# Patient Record
Sex: MALE | Race: BLACK OR AFRICAN AMERICAN | NOT HISPANIC OR LATINO | URBAN - METROPOLITAN AREA
[De-identification: names, ages, dates, MRNs, and addresses within clinical notes are randomized per-mention and may not be internally consistent; named-entity substitution may affect disease eponyms.]

---

## 2023-05-25 ENCOUNTER — EMERGENCY (EMERGENCY)
Facility: HOSPITAL | Age: 23
LOS: 1 days | Discharge: HOME | End: 2023-05-25
Admitting: STUDENT IN AN ORGANIZED HEALTH CARE EDUCATION/TRAINING PROGRAM
Payer: COMMERCIAL

## 2023-05-25 VITALS
HEIGHT: 77 IN | RESPIRATION RATE: 18 BRPM | SYSTOLIC BLOOD PRESSURE: 118 MMHG | HEART RATE: 57 BPM | TEMPERATURE: 98 F | DIASTOLIC BLOOD PRESSURE: 74 MMHG | WEIGHT: 250 LBS | OXYGEN SATURATION: 98 %

## 2023-05-25 VITALS
HEART RATE: 60 BPM | OXYGEN SATURATION: 99 % | DIASTOLIC BLOOD PRESSURE: 81 MMHG | SYSTOLIC BLOOD PRESSURE: 130 MMHG | RESPIRATION RATE: 18 BRPM
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
## 2023-05-25 DIAGNOSIS — R22.42 LOCALIZED SWELLING, MASS AND LUMP, LEFT LOWER LIMB: ICD-10-CM

## 2023-05-25 DIAGNOSIS — Z87.39 PERSONAL HISTORY OF OTHER DISEASES OF THE MUSCULOSKELETAL SYSTEM AND CONNECTIVE TISSUE: ICD-10-CM
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
## 2023-05-25 DIAGNOSIS — S82.002A UNSPECIFIED FRACTURE OF LEFT PATELLA, INITIAL ENCOUNTER FOR CLOSED FRACTURE: ICD-10-CM

## 2023-05-25 DIAGNOSIS — M25.562 PAIN IN LEFT KNEE: ICD-10-CM

## 2023-05-25 PROCEDURE — 99285 EMERGENCY DEPT VISIT HI MDM: CPT

## 2023-05-25 PROCEDURE — G1004: CPT

## 2023-05-25 PROCEDURE — 93971 EXTREMITY STUDY: CPT | Mod: 26,LT

## 2023-05-25 PROCEDURE — 73562 X-RAY EXAM OF KNEE 3: CPT | Mod: 26,LT

## 2023-05-25 PROCEDURE — 73700 CT LOWER EXTREMITY W/O DYE: CPT | Mod: 26,LT,MG

## 2023-05-25 NOTE — ED ADULT NURSE NOTE - OBJECTIVE STATEMENT
Left knee pain and swelling, patient states hurt knee during an MMA bout, fell and injured left knee.  States taking Naproxen and ACE bandage and ice with no improvement.

## 2023-05-25 NOTE — ED PROVIDER NOTE - NSFOLLOWUPINSTRUCTIONS_ED_ALL_ED_FT
Wear knee immobilizer until follow up with Orthopedics.    You can bear weight and perform your normal activities but do not spar or compete in [employer] until reevaluated.    Please call Dr. Kaur's office to schedule follow up.   Address: [address] [place] [address]  Phone: [phone]    Return to the Emergency Department if you develop fever, numbness, weakness or any other concerns.

## 2023-05-25 NOTE — ED PROVIDER NOTE - CLINICAL SUMMARY MEDICAL DECISION MAKING FREE TEXT BOX
Pt hemodynamically stable. He has a large effusion of the left knee with tenderness over anterior patella. XR L knee notable for non-displaced fracture of the patella. Orthopedics consulted, requesting US venous LLE as pt complained of calf heaviness and CT L knee for further evaluation. US venous LLE without evidence of DVT. CT and ortho recs pending. Pt hemodynamically stable. He has a large effusion of the left knee with tenderness over anterior patella. XR L knee notable for non-displaced fracture of the patella. Orthopedics consulted, requesting US venous LLE as pt complained of calf heaviness and CT L knee for further evaluation. US venous LLE without evidence of DVT. CT pending. Ortho placed pt in knee immobilizer. Pt able to weight bear. Will f/u with Dr. Kaur.

## 2023-05-25 NOTE — ED PROVIDER NOTE - PHYSICAL EXAMINATION
VITAL SIGNS: I have reviewed nursing notes and confirm.  CONSTITUTIONAL: Well-developed; in no acute distress.   SKIN:  warm and dry, no acute rash.   HEAD:  normocephalic, atraumatic.  EYES: PERRL, EOM intact; conjunctiva and sclera clear.  ENT: No nasal discharge; airway clear.   NECK: Supple; non tender.  CARD: S1, S2 normal; no murmurs, gallops, or rubs. Regular rate and rhythm.   RESP:  Clear to auscultation b/l, no wheezes, rales or rhonchi.  ABD: Normal bowel sounds; soft; non-distended; non-tender; no guarding/ rebound.  EXT: Normal ROM. No clubbing, cyanosis or edema. 2+ pulses to b/l ue/le.  NEURO: Alert, oriented, grossly unremarkable  PSYCH: Cooperative, mood and affect appropriate.    LLE: Pt has knee effusion with tenderness over anterior patella. ROM limited by swelling. He has no calf tenderness or swelling. 2+ DP pulse. Distal sensation intact.

## 2023-05-25 NOTE — CONSULT NOTE ADULT - SUBJECTIVE AND OBJECTIVE BOX
Orthopaedic Surgery Consult Note    Attending Physician: Vincent  Consult requested by: ED    CC: L knee pain    HPI:  22yMale healthy  presenting with 3 week history of L knee pain. During sparring session 3 weeks ago pt hada  mechanical fall onto L knee. Presented to [facility] for evaluation, and was told that he did not have a fracture. Patient continued to exercise and spare [employer] fights but noticed he wasn't able to tolerate sessions due to L knee pain. Denies any new recent falls since. Has been able to ambulate since the fall but with discomfort. Patient also noted increase in L calf heaviness and swelling since 4 days ago. Denies any chest pain or shortness of breath. Denies any numbness/tingling to LLE. No previous LLE surgeries.    Allergies    No Known Allergies    Intolerances      PAST MEDICAL & SURGICAL HISTORY:      Meds:      Family History:  Denies family history of bleeding disorders    Social History:   Pt is a nonsmoker  Social EtOH use     Review of Systems:  All review of systems are negative except for those mentioned in HPI.    Physical Exam:  General: Pt Alert and oriented, NAD  L knee swollen, skin intact. no open wounds. no palpable step offs  L calf slightly more swollen compared to RLE, no calf tenderness  Pulses: 2+ dp, pt pulses, wwp, cap refill <3 seconds  Sensation: SILT throughout LE.  Motor: EHL/FHL/TA/GC 5/5, knee extension, hamstrings 5/5 strength b/l LE    Vital Signs Last 24 Hrs  T(C): 36.7 (25 May 2023 16:09), Max: 36.7 (25 May 2023 16:09)  T(F): 98 (25 May 2023 16:09), Max: 98 (25 May 2023 16:09)  HR: 57 (25 May 2023 16:09) (57 - 57)  BP: 118/74 (25 May 2023 16:09) (118/74 - 118/74)  BP(mean): --  RR: 18 (25 May 2023 16:09) (18 - 18)  SpO2: 98% (25 May 2023 16:09) (98% - 98%)    Parameters below as of 25 May 2023 16:09  Patient On (Oxygen Delivery Method): room air          Labs:              Imaging:   Xray  L Knee (AP/lateral/sunrise): nondisplaced vertical fracture of the patella    A/P: 22yMale with L patella fx.  - stable  - pain control  - placed in KI  - WBAT in KI  - obtain CT of L knee w/o contrast prior to discharge  - obtain US of LLE to r/o dvt  - f/u outpatient for observation of bony healing  - Discussed with Attending, Dr. Vincent Ceja, PGY-2  Ortho Pager [phone]

## 2023-05-25 NOTE — ED ADULT NURSE NOTE - NSFALLUNIVINTERV_ED_ALL_ED
Bed/Stretcher in lowest position, wheels locked, appropriate side rails in place/Call bell, personal items and telephone in reach/Instruct patient to call for assistance before getting out of bed/chair/stretcher/Non-slip footwear applied when patient is off stretcher/[place] to call system/Physically safe environment - no spills, clutter or unnecessary equipment/Purposeful proactive rounding/Room/bathroom lighting operational, light cord in reach

## 2023-05-25 NOTE — ED PROVIDER NOTE - OBJECTIVE STATEMENT
21yo male with no reported pmhx presents with left knee pain and swelling x3wk. Pt is a  and states he landed directly on his knee during a fight. He has continued to spar and is able to weight bear. He states he has pain in his knee cap and has had progressively worsening swelling. He denies other injuries or trauma. He is not taking anything currently for pain.

## 2023-08-16 ENCOUNTER — EMERGENCY (EMERGENCY)
Facility: HOSPITAL | Age: 23
LOS: 1 days | Discharge: HOME | End: 2023-08-16
Admitting: EMERGENCY MEDICINE
Payer: MEDICAID

## 2023-08-16 VITALS
OXYGEN SATURATION: 99 % | HEART RATE: 63 BPM | TEMPERATURE: 98 F | RESPIRATION RATE: 18 BRPM | DIASTOLIC BLOOD PRESSURE: 76 MMHG | SYSTOLIC BLOOD PRESSURE: 126 MMHG

## 2023-08-16 VITALS
HEART RATE: 58 BPM | DIASTOLIC BLOOD PRESSURE: 74 MMHG | OXYGEN SATURATION: 100 % | TEMPERATURE: 99 F | RESPIRATION RATE: 18 BRPM | SYSTOLIC BLOOD PRESSURE: 120 MMHG

## 2023-08-16 PROCEDURE — 73562 X-RAY EXAM OF KNEE 3: CPT | Mod: 26,RT

## 2023-08-16 PROCEDURE — 73590 X-RAY EXAM OF LOWER LEG: CPT | Mod: 26,RT

## 2023-08-16 PROCEDURE — 99284 EMERGENCY DEPT VISIT MOD MDM: CPT

## 2023-08-16 RX ORDER — IBUPROFEN 200 MG
600 TABLET ORAL ONCE
Refills: 0 | Status: COMPLETED | OUTPATIENT
Start: 2023-08-16 | End: 2023-08-16

## 2023-08-16 RX ORDER — OXYCODONE AND ACETAMINOPHEN 5; 325 MG/1; MG/1
1 TABLET ORAL ONCE
Refills: 0 | Status: DISCONTINUED | OUTPATIENT
Start: 2023-08-16 | End: 2023-08-16

## 2023-08-16 RX ORDER — IBUPROFEN 200 MG
1 TABLET ORAL
Qty: 56 | Refills: 0
Start: 2023-08-16 | End: 2023-08-29

## 2023-08-16 RX ADMIN — OXYCODONE AND ACETAMINOPHEN 1 TABLET(S): 5; 325 TABLET ORAL at 05:17

## 2023-08-16 RX ADMIN — Medication 600 MILLIGRAM(S): at 05:17

## 2023-08-16 NOTE — ED PROVIDER NOTE - CARE PROVIDER_API CALL
Darrell Villalba  Orthopaedic Surgery  [address] [place] [address], [address]  [place] [address]  Phone: [phone]  Fax: [phone]  Follow Up Time:

## 2023-08-16 NOTE — ED PROVIDER NOTE - CLINICAL SUMMARY MEDICAL DECISION MAKING FREE TEXT BOX
ell appearing pt here with acute onset of R knee pain with ttp along the R medial knee and pain with knee flexion, otherwise neurovascular intact, and no other signs of trauma, plan: xr knee,

## 2023-08-16 NOTE — ED PROVIDER NOTE - NSFOLLOWUPINSTRUCTIONS_ED_ALL_ED_FT
[facility]  [phone]    Knee Sprain    WHAT YOU NEED TO KNOW:    A knee sprain occurs when one or more ligaments in your knee are suddenly stretched or torn. Ligaments are tissues that hold bones together. Ligaments support the knee and keep the joint and bones in the correct position. Knee Anatomy          DISCHARGE INSTRUCTIONS:    Return to the emergency department if:     Any part of your leg feels cold, numb, or looks pale         Contact your healthcare provider if:     You have new or increased swelling, bruising, or pain in your knee.      Your symptoms do not improve within 6 weeks, even with treatment.      You have questions or concerns about your condition or care.     Medicines:     NSAIDs, such as ibuprofen, help decrease swelling, pain, and fever. This medicine is available with or without a doctor's order. NSAIDs can cause stomach bleeding or kidney problems in certain people. If you take blood thinner medicine, always ask your healthcare provider if NSAIDs are safe for you. Always read the medicine label and follow directions.      Acetaminophen decreases pain and fever. It is available without a doctor's order. Ask how much to take and how often to take it. Follow directions. Read the labels of all other medicines you are using to see if they also contain acetaminophen, or ask your doctor or pharmacist. Acetaminophen can cause liver damage if not taken correctly. Do not use more than 4 grams (4,000 milligrams) total of acetaminophen in one day.       Prescription pain medicine may be given. Ask how to take this medicine safely.       Take your medicine as directed. Contact your healthcare provider if you think your medicine is not helping or if you have side effects. Tell him or her if you are allergic to any medicine. Keep a list of the medicines, vitamins, and herbs you take. Include the amounts, and when and why you take them. Bring the list or the pill bottles to follow-up visits. Carry your medicine list with you in case of an emergency.    Self-care:     Rest your knee and do not exercise. You may be told to keep weight off your knee. This means that you should not walk on your injured leg. Rest helps decrease swelling and allows the injury to heal. You can do gentle range of motion (ROM) exercises as directed. This will prevent stiffness.       Apply ice on your knee for 15 to 20 minutes every hour or as directed. Use an ice pack, or put crushed ice in a plastic bag. Cover it with a towel. Ice helps prevent tissue damage and decreases swelling and pain.      Apply compression to your knee as directed. You may need to wear an elastic bandage. This helps keep your injured knee from moving too much while it heals. You can loosen or tighten the elastic bandage to make it comfortable. It should be tight enough for you to feel support. It should not be so tight that it causes your toes to feel numb or tingly. If you are wearing an elastic bandage, take it off and rewrap it once a day.       Elevate your knee above the level of your heart as often as you can. This will help decrease swelling and pain. Prop your leg on pillows or blankets to keep it elevated comfortably. Do not put pillows directly behind your knee.       Use support devices as directed: Support devices such as a splint or brace may be needed. These devices limit movement and protect your joint while it heals. You may be given crutches to use until you can stand on your injured leg without pain. Use devices as directed.     Physical therapy: A physical therapist teaches you exercises to help improve movement and strength, and to decrease pain.     Prevent another knee sprain: Exercise your legs to keep your muscles strong. Strong leg muscles help protect your knee and prevent strain. The following may also prevent a knee sprain:     Slowly start your exercise or training program. Slowly increase the time, distance, and intensity of your exercise. Sudden increases in training may cause you to injure your knee again.       Wear protective braces and equipment as directed. Braces may prevent your knee from moving the wrong way and causing another sprain. Protective equipment may support your bones and ligaments to prevent injury.       Warm up and stretch before exercise. Warm up by walking or using an exercise bike before starting your regular exercise. Do gentle stretches after warming up. This helps to loosen your muscles and decrease stress on your knee. Cool down and stretch after you exercise.       Wear shoes that fit correctly and support your feet. Replace your running or exercise shoes before the padding or shock absorption is worn out. Ask your healthcare provider which exercise shoes are best for you. Ask if you should wear special shoe inserts. Shoe inserts can help support your heels and arches or keep your foot lined up correctly in your shoes. Exercise on flat surfaces.    Follow up with your healthcare provider as directed: Write down your questions so you remember to ask them during your visits.        © Copyright [employer] 2019 All illustrations and images included in CareNotes are the copyrighted property of [employer] or [employer].

## 2023-08-16 NOTE — ED PROVIDER NOTE - OBJECTIVE STATEMENT
24 yo m no sig pmhx pw acute onset of R knee pain aching mod in severity non radiating occurred during mixed martial arts sparing when pt had a twisting injury to the R knee. Able to bare weight and ambulate, but pain with R knee flexion.    I have reviewed available current nursing and previous documentation of past medical, surgical, family, and/or social history.

## 2023-08-16 NOTE — ED PROVIDER NOTE - PATIENT PORTAL LINK FT
You can access the FollowMyHealth Patient Portal offered by [facility] by registering at the following website: [email or web address]. By joining [employer]’s FollowMyHealth portal, you will also be able to view your health information using other applications (apps) compatible with our system.

## 2023-08-16 NOTE — ED ADULT NURSE NOTE - NSFALLUNIVINTERV_ED_ALL_ED
Bed/Stretcher in lowest position, wheels locked, appropriate side rails in place/Call bell, personal items and telephone in reach/Instruct patient to call for assistance before getting out of bed/chair/stretcher/Non-slip footwear applied when patient is off stretcher/[place] to call system/Physically safe environment - no spills, clutter or unnecessary equipment/Purposeful proactive rounding/Room/bathroom lighting operational, light cord in reach

## 2023-08-16 NOTE — ED ADULT NURSE NOTE - TEMPLATE LIST FOR HEAD TO TOE ASSESSMENT
Braxton is scheduled for CT Angiogram Abdomen and Pelvis, EVAR protocol and Bilateral Carotid Ultrasound on Friday, 9/7/18 at 1:00 pm.  Arrival time: 12:45 pm.  Advised to have no solid foods 4 hours prior to exam.  Instructions given to patient.  We will call him with the results per [facility] [place].   General

## 2023-08-16 NOTE — ED PROVIDER NOTE - PHYSICAL EXAMINATION
Physical Exam    Vital Signs: I have reviewed the initial vital signs.  Constitutional: well-appearing, appears stated age  Cardiovascular: regular rate, regular rhythm, well-perfused extremities, DP pulse +2 and equal b/l  Musculoskeletal: +pain with r knee flexion and ttp along the R lateral knee, no swelling or ligamentous instability able to fully range the joint  Integumentary: warm, dry, no rash  Neurologic: extremities’ motor and sensory functions grossly intact

## 2023-08-16 NOTE — ED ADULT TRIAGE NOTE - CHIEF COMPLAINT QUOTE
BIBA with complaints of right shin pain x 8 hrs. States he was sparing at the gym and was kicked in affected area. Painful to bear weight, no meds taken PTA.
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
## 2023-08-18 DIAGNOSIS — S83.91XA SPRAIN OF UNSPECIFIED SITE OF RIGHT KNEE, INITIAL ENCOUNTER: ICD-10-CM
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
## 2023-08-18 DIAGNOSIS — M25.561 PAIN IN RIGHT KNEE: ICD-10-CM

## 2023-12-02 ENCOUNTER — EMERGENCY (EMERGENCY)
Facility: HOSPITAL | Age: 23
LOS: 1 days | Discharge: HOME | End: 2023-12-02
Attending: EMERGENCY MEDICINE | Admitting: EMERGENCY MEDICINE
Payer: MEDICAID

## 2023-12-02 VITALS
RESPIRATION RATE: 15 BRPM | HEART RATE: 76 BPM | SYSTOLIC BLOOD PRESSURE: 121 MMHG | DIASTOLIC BLOOD PRESSURE: 75 MMHG | WEIGHT: 240.08 LBS | HEIGHT: 78 IN | TEMPERATURE: 98 F | OXYGEN SATURATION: 98 %

## 2023-12-02 PROCEDURE — 73030 X-RAY EXAM OF SHOULDER: CPT | Mod: 26,RT

## 2023-12-02 PROCEDURE — 99284 EMERGENCY DEPT VISIT MOD MDM: CPT

## 2023-12-02 RX ORDER — IBUPROFEN 200 MG
600 TABLET ORAL ONCE
Refills: 0 | Status: COMPLETED | OUTPATIENT
Start: 2023-12-02 | End: 2023-12-02

## 2023-12-02 RX ADMIN — Medication 600 MILLIGRAM(S): at 23:09

## 2023-12-02 NOTE — ED PROVIDER NOTE - NSFOLLOWUPINSTRUCTIONS_ED_ALL_ED_FT
Shoulder Pain  Many things can cause shoulder pain, including:  An injury to the shoulder.  Overuse of the shoulder.  Arthritis.  The source of the pain can be:  Inflammation.  An injury to the shoulder joint.  An injury to a tendon, ligament, or bone.  Follow these instructions at home:  Pay attention to changes in your symptoms. Let your health care provider know about them. Follow these instructions to relieve your pain.    If you have a removable sling:    Wear the sling as told by your provider. Remove it only as told by your provider.  Check the skin around the sling every day. Tell your provider about any concerns.  Loosen the sling if your fingers tingle, become numb, or become cold.  Keep the sling clean.  If the sling is not waterproof:  Do not let it get wet.  Remove it to shower or bathe.  Move your arm as little as possible, but keep your hand moving to prevent swelling.  Managing pain, stiffness, and swelling    Bag of ice on a towel on the skin.  If told, put ice on the painful area.  If you have a removable sling or immobilizer, remove it as told by your provider.  Put ice in a plastic bag.  Place a towel between your skin and the bag.  Leave the ice on for 20 minutes, 2–3 times a day.  If your skin turns bright red, remove the ice right away to prevent skin damage. The risk of damage is higher if you cannot feel pain, heat, or cold.  Move your fingers often to reduce stiffness and swelling.  Squeeze a soft ball or a foam pad as much as possible. This helps to keep the shoulder from swelling. It also helps to strengthen the arm.  General instructions    Take over-the-counter and prescription medicines only as told by your provider.  Exercise may help with pain management. Perform exercises if told by your provider.  You may be referred to a physical therapist to help in your recovery process.  Keep all follow-up visits in order to avoid any type of permanent shoulder disability or chronic pain problems.  Contact a health care provider if:  Your pain is not relieved with medicines.  New pain develops in your arm, hand, or fingers.  You loosen your sling and your arm, hand, or fingers remain tingly, numb, swollen, or painful.  Get help right away if:  Your arm, hand, or fingers turn white or blue.  This information is not intended to replace advice given to you by your health care provider. Make sure you discuss any questions you have with your health care provider.    Document Revised: 07/21/2023 Document Reviewed: 07/21/2023  Elsevier Patient Education © 2023 [employer] Inc.

## 2023-12-02 NOTE — ED ADULT NURSE NOTE - OBJECTIVE STATEMENT
Pt is a 23y male c/o R shoulder pain/injury. Pt states he was doing MMA and landed on his R elbow causing pain and pressure in his R shoulder. Arrives with pain and limited ROM. Denies head strike or other injuries.

## 2023-12-02 NOTE — ED PROVIDER NOTE - CLINICAL SUMMARY MEDICAL DECISION MAKING FREE TEXT BOX
24 yo M presented to the ED for R shoulder pain. normal ROM making dislocation unlikely. will get screening xray and give motrin for pain

## 2023-12-02 NOTE — ED PROVIDER NOTE - OBJECTIVE STATEMENT
24 yo M with no PMH presents to the ED for R shoulder pain. Pt was practicing his MMA and was slammed 3 times into his R shoulder. Pt states the pain is worse with movement but he is able to move it. No head injury or other concerns. 22 yo M with no PMH presents to the ED for R shoulder pain. Pt was practicing his MMA and was slammed 3 times into his R shoulder. Pt states the pain is worse with movement but he is able to move it. No head injury or other concerns.

## 2023-12-02 NOTE — ED PROVIDER NOTE - PHYSICAL EXAMINATION
Const: NAD  Eyes: PERRL, no conjunctival injection  HENT:  Neck supple without meningismus   CV: RRR, Warm, well-perfused extremities  RESP: CTA B/L, no tachypnea   MSK: No gross deformities appreciated, normal ROM of R shoulder with pain  Skin: Warm, dry. No rashes  Neuro: Alert, CNs II-XII grossly intact. Sensation and motor function of extremities grossly intact.  Psych: Appropriate mood and affect.

## 2023-12-02 NOTE — ED ADULT NURSE NOTE - NSFALLUNIVINTERV_ED_ALL_ED
Bed/Stretcher in lowest position, wheels locked, appropriate side rails in place/Call bell, personal items and telephone in reach/Instruct patient to call for assistance before getting out of bed/chair/stretcher/Non-slip footwear applied when patient is off stretcher/[place] to call system/Physically safe environment - no spills, clutter or unnecessary equipment/Purposeful proactive rounding/Room/bathroom lighting operational, light cord in reach Bed/Stretcher in lowest position, wheels locked, appropriate side rails in place/Call bell, personal items and telephone in reach/Instruct patient to call for assistance before getting out of bed/chair/stretcher/Non-slip footwear applied when patient is off stretcher/[place] to call system/Physically safe environment - no spills, clutter or unnecessary equipment/Purposeful proactive rounding/Room/bathroom lighting operational, light cord in reach Bed/Stretcher in lowest position, wheels locked, appropriate side rails in place/Call bell, personal items and telephone in reach/Instruct patient to call for assistance before getting out of bed/chair/stretcher/Non-slip footwear applied when patient is off stretcher/[place] to call system/Physically safe environment - no spills, clutter or unnecessary equipment/Purposeful proactive rounding/Room/bathroom lighting operational, light cord in reach

## 2023-12-02 NOTE — ED ADULT TRIAGE NOTE - WEIGHT IN KG
I have seen and examined the patient today.  No changes in physical findings.  We will proceed with the procedure as planned.    Betty Vaca MD  Advanced Endoscopy Fellow     108.9

## 2023-12-02 NOTE — ED ADULT NURSE NOTE - SUICIDE SCREENING QUESTION 1
Please call the patient regarding abnormal result  Random glucose is elevated on comprehensive metabolic panel at 704  Hemoglobin A1c is elevated and uncontrolled at 8 6  Please send in blood sugar data/freestyle jared downloads for review so further changes can be made to his regimen to help his glycemic control throughout the day 
No

## 2023-12-02 NOTE — ED ADULT TRIAGE NOTE - CHIEF COMPLAINT QUOTE
Pt with complaint of right shoulder pain after he got slammed on his right side while doing martial mixed arts.

## 2023-12-02 NOTE — ED PROVIDER NOTE - PATIENT PORTAL LINK FT
You can access the FollowMyHealth Patient Portal offered by [facility] by registering at the following website: [email or web address]. By joining [employer]’s FollowMyHealth portal, you will also be able to view your health information using other applications (apps) compatible with our system. You can access the FollowMyHealth Patient Portal offered by [facility] by registering at the following website: [email or web address]. By joining [employer]’s FollowMyHealth portal, you will also be able to view your health information using other applications (apps) compatible with our system. You can access the FollowMyHealth Patient Portal offered by  by registering at the following website: [email or web address]. By joining [employer]’s FollowMyHealth portal, you will also be able to view your health information using other applications (apps) compatible with our system.
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
## 2023-12-04 DIAGNOSIS — M25.511 PAIN IN RIGHT SHOULDER: ICD-10-CM
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
## 2023-12-19 ENCOUNTER — EMERGENCY (EMERGENCY)
Facility: HOSPITAL | Age: 23
LOS: 1 days | Discharge: HOME | End: 2023-12-19
Admitting: EMERGENCY MEDICINE
Payer: MEDICAID

## 2023-12-19 VITALS
HEIGHT: 78 IN | OXYGEN SATURATION: 96 % | SYSTOLIC BLOOD PRESSURE: 123 MMHG | DIASTOLIC BLOOD PRESSURE: 73 MMHG | RESPIRATION RATE: 18 BRPM | HEART RATE: 82 BPM | TEMPERATURE: 98 F

## 2023-12-19 LAB
FLUAV AG NPH QL: SIGNIFICANT CHANGE UP
FLUAV AG NPH QL: SIGNIFICANT CHANGE UP
FLUBV AG NPH QL: SIGNIFICANT CHANGE UP
FLUBV AG NPH QL: SIGNIFICANT CHANGE UP
RSV RNA NPH QL NAA+NON-PROBE: SIGNIFICANT CHANGE UP
RSV RNA NPH QL NAA+NON-PROBE: SIGNIFICANT CHANGE UP
SARS-COV-2 RNA SPEC QL NAA+PROBE: SIGNIFICANT CHANGE UP
SARS-COV-2 RNA SPEC QL NAA+PROBE: SIGNIFICANT CHANGE UP

## 2023-12-19 PROCEDURE — 99284 EMERGENCY DEPT VISIT MOD MDM: CPT

## 2023-12-19 PROCEDURE — 71046 X-RAY EXAM CHEST 2 VIEWS: CPT | Mod: 26

## 2023-12-19 NOTE — ED ADULT NURSE NOTE - NSFALLUNIVINTERV_ED_ALL_ED
Bed/Stretcher in lowest position, wheels locked, appropriate side rails in place/Call bell, personal items and telephone in reach/Instruct patient to call for assistance before getting out of bed/chair/stretcher/Non-slip footwear applied when patient is off stretcher/[place] to call system/Physically safe environment - no spills, clutter or unnecessary equipment/Purposeful proactive rounding/Room/bathroom lighting operational, light cord in reach Bed/Stretcher in lowest position, wheels locked, appropriate side rails in place/Call bell, personal items and telephone in reach/Instruct patient to call for assistance before getting out of bed/chair/stretcher/Non-slip footwear applied when patient is off stretcher/[place] to call system/Physically safe environment - no spills, clutter or unnecessary equipment/Purposeful proactive rounding/Room/bathroom lighting operational, light cord in reach

## 2023-12-19 NOTE — ED PROVIDER NOTE - PHYSICAL EXAMINATION
Physical Exam    Vital Signs: I have reviewed the initial vital signs.  Constitutional: well-appearing, appears stated age  Eyes: PERRLA, EOM intact, RAPD absent, and symmetrical lids.  ENT: neck supple with no adenopathy, moist MM, +rhinorrhea  Cardiovascular: +S1/S2, no murmurs, regular rate, regular rhythm, well-perfused extremities  Respiratory: unlabored respiratory effort, clear to auscultation bilaterally, speaks in full sentences  Gastrointestinal: soft, non-tender abdomen, no pulsatile mass

## 2023-12-19 NOTE — ED PROVIDER NOTE - CLINICAL SUMMARY MEDICAL DECISION MAKING FREE TEXT BOX
well appearing here with an episode of paroxysmal cough with rhinorrhea had an episode of near syncope during the episode of coughin while at work, reports malaise. No cp or sob.

## 2023-12-19 NOTE — ED PROVIDER NOTE - OBJECTIVE STATEMENT
22 yo m no sig pmhx here with acute onset of rhinorrhea with paroxysmal cough that caused an episode of vomiting and near syncope, ongoing for 2 hr in duration associated with malaise.    I have reviewed available current nursing and previous documentation of past medical, surgical, family, and/or social history. 24 yo m no sig pmhx here with acute onset of rhinorrhea with paroxysmal cough that caused an episode of vomiting and near syncope, ongoing for 2 hr in duration associated with malaise.    I have reviewed available current nursing and previous documentation of past medical, surgical, family, and/or social history.

## 2023-12-19 NOTE — ED PROVIDER NOTE - NS ED ROS FT
Review of Systems    Constitutional: (-) fever (-) weakness (-) diaphoresis   Eyes: (-) change in vision (-) photophobia (-) eye pain  ENT: (-) sore throat (-) ear ache  Cardiovascular: (-) chest pain (-) palpitations  Respiratory: (-) SOB  GI: (-) abdominal pain (-) N/V (-) diarrhea  Integumentary: (-) rash (-) redness   Neurological:  (-) focal deficit (-) altered mental status

## 2023-12-19 NOTE — ED ADULT NURSE NOTE - OBJECTIVE STATEMENT
c/o cough, sore throat, intermittent dizziness. Pt alert and oriented x3, ambulatory, respirations even and unlabored.

## 2023-12-19 NOTE — ED ADULT NURSE NOTE - NS ED NURSE LEVEL OF CONSCIOUSNESS AFFECT
January 31, 2023        Sonya Mccloud  [address]  [facility] [address]    To Whom It May Concern:    This is to certify Sonya Mccloud was evaluated on 01/31/23 and is unable to return to work.    CDC guidelines for return to work are as follows:    • Sonya Mccloud should self-isolate for 5 days.  • After isolation is completed, she may return to work, wearing a mask while around others.  • Perform a home COVID-19 test on days 6 and 8. If both are negative, masking can be discontinued.    **The loss of taste and smell may persist for weeks or months after recovery and do not need to delay the end of isolation.     Per CDC recommendations, employers should not require a COVID-19 test result or a healthcare provider’s note for employees who are sick to validate their illness, qualify for sick leave, or to return to work.    The Coronavirus is a rapidly evolving situation and recommendations are changing regularly to prevent spread of the disease and further loss of life.    Thank you for your understanding during these unusual times.     Electronically signed by:     Ge Rae MD                 [id number] [facility] [id number]     Calm/Appropriate

## 2023-12-21 DIAGNOSIS — J06.9 ACUTE UPPER RESPIRATORY INFECTION, UNSPECIFIED: ICD-10-CM

## 2023-12-21 DIAGNOSIS — R53.81 OTHER MALAISE: ICD-10-CM

## 2023-12-21 DIAGNOSIS — B97.89 OTHER VIRAL AGENTS AS THE CAUSE OF DISEASES CLASSIFIED ELSEWHERE: ICD-10-CM

## 2023-12-21 DIAGNOSIS — Z20.822 CONTACT WITH AND (SUSPECTED) EXPOSURE TO COVID-19: ICD-10-CM

## 2024-08-30 ENCOUNTER — EMERGENCY (EMERGENCY)
Facility: HOSPITAL | Age: 24
LOS: 1 days | Discharge: HOME | End: 2024-08-30
Admitting: EMERGENCY MEDICINE
Payer: SELF-PAY

## 2024-08-30 VITALS
SYSTOLIC BLOOD PRESSURE: 133 MMHG | HEART RATE: 73 BPM | RESPIRATION RATE: 18 BRPM | OXYGEN SATURATION: 97 % | TEMPERATURE: 99 F | DIASTOLIC BLOOD PRESSURE: 75 MMHG
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
## 2024-08-30 DIAGNOSIS — R51.9 HEADACHE, UNSPECIFIED: ICD-10-CM

## 2024-08-30 DIAGNOSIS — S01.21XA LACERATION WITHOUT FOREIGN BODY OF NOSE, INITIAL ENCOUNTER: ICD-10-CM

## 2024-08-30 DIAGNOSIS — Z23 ENCOUNTER FOR IMMUNIZATION: ICD-10-CM
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
## 2024-08-30 PROCEDURE — 70450 CT HEAD/BRAIN W/O DYE: CPT | Mod: 26,MC

## 2024-08-30 PROCEDURE — 70486 CT MAXILLOFACIAL W/O DYE: CPT | Mod: 26,MC

## 2024-08-30 PROCEDURE — 12052 INTMD RPR FACE/MM 2.6-5.0 CM: CPT

## 2024-08-30 PROCEDURE — 99284 EMERGENCY DEPT VISIT MOD MDM: CPT | Mod: 25

## 2024-08-30 RX ORDER — TETANUS TOXOID, REDUCED DIPHTHERIA TOXOID AND ACELLULAR PERTUSSIS VACCINE, ADSORBED 5; 2.5; 8; 8; 2.5 [IU]/.5ML; [IU]/.5ML; UG/.5ML; UG/.5ML; UG/.5ML
0.5 SUSPENSION INTRAMUSCULAR ONCE
Refills: 0 | Status: COMPLETED | OUTPATIENT
Start: 2024-08-30 | End: 2024-08-30

## 2024-08-30 RX ORDER — OXYMETAZOLINE HYDROCHLORIDE 0.05 G/100ML
1 SPRAY, METERED NASAL ONCE
Refills: 0 | Status: COMPLETED | OUTPATIENT
Start: 2024-08-30 | End: 2024-08-30

## 2024-08-30 RX ORDER — AMOXICILLIN AND CLAVULANATE POTASSIUM 250; 125 MG/1; MG/1
1 TABLET, FILM COATED ORAL ONCE
Refills: 0 | Status: COMPLETED | OUTPATIENT
Start: 2024-08-30 | End: 2024-08-30

## 2024-08-30 RX ORDER — ACETAMINOPHEN WITH CODEINE 300MG-30MG
1 TABLET ORAL
Qty: 14 | Refills: 0
Start: 2024-08-30

## 2024-08-30 RX ORDER — AMOXICILLIN AND CLAVULANATE POTASSIUM 250; 125 MG/1; MG/1
875 TABLET, FILM COATED ORAL
Qty: 14 | Refills: 0
Start: 2024-08-30

## 2024-08-30 RX ORDER — ACETAMINOPHEN 325 MG/1
2 TABLET ORAL
Qty: 20 | Refills: 0
Start: 2024-08-30

## 2024-08-30 RX ORDER — ACETAMINOPHEN 325 MG/1
975 TABLET ORAL ONCE
Refills: 0 | Status: COMPLETED | OUTPATIENT
Start: 2024-08-30 | End: 2024-08-30

## 2024-08-30 RX ADMIN — AMOXICILLIN AND CLAVULANATE POTASSIUM 1 TABLET(S): 250; 125 TABLET, FILM COATED ORAL at 20:43

## 2024-08-30 RX ADMIN — ACETAMINOPHEN 975 MILLIGRAM(S): 325 TABLET ORAL at 20:43

## 2024-08-30 RX ADMIN — OXYMETAZOLINE HYDROCHLORIDE 1 SPRAY(S): 0.05 SPRAY, METERED NASAL at 21:08

## 2024-08-30 RX ADMIN — TETANUS TOXOID, REDUCED DIPHTHERIA TOXOID AND ACELLULAR PERTUSSIS VACCINE, ADSORBED 0.5 MILLILITER(S): 5; 2.5; 8; 8; 2.5 SUSPENSION INTRAMUSCULAR at 20:44

## 2024-08-30 NOTE — ED PROVIDER NOTE - CARE PROVIDER_API CALL
[facility]  [address] [place] [address], [address]  [place] [address]  Phone: [phone]  Fax: [phone]  Follow Up Time:     suture removal in 7 days,   Phone: (   )    -  Fax: (   )    -  Follow Up Time:

## 2024-08-30 NOTE — ED PROVIDER NOTE - NSFOLLOWUPINSTRUCTIONS_ED_ALL_ED_FT
NON-DISSOLVABLE SUTURES  * Please note minor swelling, redness, pain, itching, and occasional bleeding (that’s controlled by direct pressure) are common with all wounds and normally will go away as wound heals     • Keep dressing clean and dry for 24 hours   • May gently clean wound with soap and water after 24 hours to avoid crusting – PAT DRY after each wash  • Open wound to air or loosen Band-Aid to allow aeration   • Apply Bacitracin or Neosporin ointment twice daily    • Return in 7 days for suture removal    PLEASE SEEK MEDICAL ATTENTION OR RETURN TO ER IMMEDIATELY IF:  * Swelling, redness, or pain increases and cannot be controlled by prescribed pain medication  * Wound feels warm to touch   * Fever >100.4F  * Wound edges reopen/separate   * Foul smelling discharge from wound   * Uncontrolled bleeding with direct pressure  * Increased numbness/tingling/discoloration of wound site     Maxillofacial Fracture  A maxillofacial fracture, also called a midface fracture, is a break in the bones of the middle part of the face that form the upper jaw (maxilla). These bones include:  The maxilla.  The cheekbones.  The lower rim and floor of the eye socket (inferior orbital or orbital floor).  The opening to the nasal passages (nasal cavity).  Sometimes, maxillofacial fractures involve multiple facial bones, also called complex fractures, and may partially or completely detach from the skull ([place] fractures). [place] fractures can be very severe and can be life-threatening.    What are the causes?  Maxillofacial fractures are usually caused by strong, blunt force to the midface area. Causes include:  Motor vehicle accidents.  Contact sports accidents.  Combat sports or martial arts.  Injuries in sports that use hard balls or bats.  Falls.  Workplace accidents.  Violent assaults.  What are the signs or symptoms?  Symptoms of this condition include:  Swelling, bruising, and pain, or tenderness of the face.  Bleeding or blood clots in the nose or mouth.  Clear drainage from the nose.  A change in the appearance of the face (facial deformity) and numbness.  A change in how the teeth fit together (malocclusion).  Inability to close the mouth at all.  Double vision, blurry vision, or inability to move the affected eye normally.  Bleeding into the lining of the eyelid or white area of the eye (subconjunctival hemorrhage).  Trouble breathing, swallowing, or speaking.  How is this diagnosed?  This condition may be diagnosed based on your symptoms and a physical exam. The exam involves checking for:  Airway blockage and any life-threatening bleeding.  Facial deformity, such as a gap or dent in the upper jaw that can be felt or moved, or widening and flattening of the face.  Vision problems.  Numbness or paralysis of eye muscles or facial muscles, or both.  Damage to the teeth and to the inside of the mouth and nose.  You may also have tests, such as X-rays or a CT scan, to confirm your diagnosis and determine how severe your fracture is.    How is this treated?  Early treatment    Treatment depends on how severe the fracture is and where it is found. Treatment may start in the emergency room to make sure blood clots or swelling do not block breathing. Treatments may include:  Endotracheal tube. This is a breathing tube that may be put through the nose into the windpipe.  Tracheostomy. This procedure involves making an opening in the lower windpipe to put in a breathing tube.  Antibiotic medicines, pain medicines, and medicines to reduce swelling.  Treatments specific to the type of fracture.  Maxillomandibular fixation    In most cases, this condition may require a procedure to wire the upper teeth to the lower teeth (maxillomandibular fixation). You may need to have your teeth wired together for several weeks to stabilize the fracture during healing. For minor fractures, this may be the only treatment needed.    Fractures that are out of position (displaced) or unstable may require open reduction. This is a surgery to move the broken bones back into position and then support them with plates and screws or wires.    Follow these instructions at home:  Medicines    Take over-the-counter and prescription medicines only as told by your health care provider.  Take your antibiotic medicine as told by your health care provider. Do not stop taking the antibiotic even if you start to feel better.  Maxillomandibular fixation care    If your teeth have been wired together:  Follow instructions for caring for your mouth and teeth (oral hygiene).  Make sure you know what to do if you vomit. You may be given a  to cut the wires off your teeth.  Follow instructions from your health care provider about eating or drinking restrictions. You will need to remain on a liquid and pureed food diet while your teeth are wired together.  Incision care    Two stitched incisions. One is normal. The other is red with pus and infected.  If you have facial incisions, follow instructions from your health care provider about how to take care of your incisions. Make sure you:  Wash your hands with soap and water for at least 20 seconds before and after you change your bandage (dressing). If soap and water are not available, use hand .  Change your dressing as told by your health care provider.  Leave stitches (sutures), skin glue, or adhesive strips in place. These skin closures may need to stay in place for 2 weeks or longer. If adhesive strip edges start to loosen and curl up, you may trim the loose edges. Do not remove adhesive strips completely unless your health care provider tells you to do that.  Check your incision area every day for signs of infection. Check for:  More redness, swelling, or pain.  More fluid or blood.  Warmth.  Pus or a bad smell.  Managing pain, stiffness, and swelling    A bag of ice on a towel on the skin.   If directed, put ice on the affected area. To do this:  Put ice in a plastic bag.  Place a towel between your skin and the bag.  Leave the ice on for 20 minutes, 2–3 times a day.  Remove the ice if your skin turns bright red. This is very important. If you cannot feel pain, heat, or cold, you have a greater risk of damage to the area.  Raise (elevate) your head above the level of your heart while you are sitting or lying down.  General instructions    Do not take baths, swim, or use a hot tub until your health care provider approves. Ask your health care provider if you may take showers. You may only be allowed to take sponge baths.  Return to your normal activities as told by your health care provider. Ask your health care provider what activities are safe for you.  Do not use any products that contain nicotine or tobacco. These products include cigarettes, chewing tobacco, and vaping devices, such as e-cigarettes. If you need help quitting, ask your health care provider.  Do not drink alcohol.  Do not lift anything that is heavier than 10 lb (4.5 kg), or the limit that you are told, until your health care provider says that it is safe.  Keep all follow-up visits. This is important. This includes visits to have sutures removed and wires cut from your teeth.  Contact a health care provider if:  You have chills or a fever.  You notice any signs of infection. These include redness, increased pain, or foul-smelling discharge.  Your pain is not controlled with medicine.  Get help right away if:  You have trouble breathing.  You have a sudden increase in swelling.  You need to cut the wires off your teeth because of vomiting.  These symptoms may represent a serious problem that is an emergency. Do not wait to see if the symptoms will go away. Get medical help right away. Call your local emergency services (911 in the [place] Do not drive yourself to the hospital.    Summary  A maxillofacial fracture is a break in the bones of the middle part of your face. This injury is usually caused by strong, blunt force to the midface area. Fractures in these bones may interfere with breathing, vision, chewing, and talking. Maxillofacial fractures can be very severe.  Treatment of maxillofacial fractures depends on the severity and location of the fracture. Treatment may start in the emergency room to make sure that blood clots or swelling are not blocking breathing.  In most cases, this condition may require a procedure to wire the upper teeth to the lower teeth (maxillomandibular fixation).  Severe fractures may require a surgical procedure (open reduction) to move the broken bones back into place and put in plates and screws or wires.  Follow all home care instructions and keep all follow-up visits.  This information is not intended to replace advice given to you by your health care provider. Make sure you discuss any questions you have with your health care provider.      Post-Concussion Syndrome    A concussion is a brain injury from a direct hit to the head or body. This hit causes the brain to shake quickly back and forth inside the skull. This can damage brain cells and cause chemical changes in the brain. Concussions are usually not life-threatening but can cause serious symptoms.    Post-concussion syndrome is when symptoms that occur after a concussion last longer than normal. These symptoms can last from weeks to months.      What are the causes?    The cause of this condition is not known. It can happen whether your head injury was mild or severe.      What increases the risk?    You are more likely to develop this condition if:  •You are female.      •You are a child, teen, or young adult.      •You have had a past head injury.      •You have a history of headaches.      •You have depression or anxiety.      •You have loss of consciousness or cannot remember the event (have amnesia of the event).      •You have multiple symptoms or severe symptoms at the time of your concussion.        What are the signs or symptoms?    Symptoms of this condition include:•Physical symptoms. You may have:  •Headaches.      •Tiredness.      •Dizziness and weakness.      •Blurry vision and sensitivity to light.      •Hearing difficulties.      •Problems with balance.      •Mental and emotional symptoms. You may have:  •Memory problems and trouble concentrating.      •Difficulty sleeping or staying asleep.      •Feelings of irritability.      •Anxiety or depression.      •Difficulty learning new things.          How is this diagnosed?    This condition may be diagnosed based on:  •Your symptoms.      •A description of your injury.      •Your medical history.      •Testing your strength, balance, and nerve function (neurological examination).      Your health care provider may order other tests, including brain imaging such as a CT scan or an MRI, and memory testing (neuropsychological testing).      How is this treated?    Treatment for this condition may depend on your symptoms. Symptoms usually go away on their own over time. Treatments may include:  •Medicines for headaches, anxiety, depression, and trouble sleeping (insomnia).      •Resting your brain and body for a few days after your injury.    •Rehabilitation therapy, such as:  •Physical or occupational therapy. This may include exercises to help with balance and dizziness.      •Mental health counseling. A form of talk therapy called cognitive behavioral therapy (CBT) can be especially helpful. This therapy helps you set goals and follow up on the changes that you make.      •Speech therapy.      •Vision therapy. A brain and eye specialist can recommend treatments for vision problems.          Follow these instructions at home:    Medicines     •Take over-the-counter and prescription medicines only as told by your health care provider.      •Avoid opioid prescription pain medicines when recovering from a concussion.      Activity   •Limit your mental activities for the first few days after your injury. This may include not doing the following:  •Homework or job-related work.      •Complex thinking.      •Watching TV, and using a computer or phone.      •Playing memory games and puzzles.        •Gradually return to your normal activity level. If a certain activity brings on your symptoms, stop or slow down until you can do the activity without it triggering your symptoms.      •Limit physical activity, such as exercise or sports, for the first few days after a concussion. Gradually return to normal activity as told by your health care provider.    •Rest. Rest helps your brain heal. Make sure you:  •Get plenty of sleep at night. Most adults should get at least 7–9 hours of sleep each night.      •Rest during the day. Take naps or rest breaks when you feel tired.        • Do not do high-risk activities that could cause a second concussion, such as riding a bike or playing sports. Having another concussion before the first one has healed can be dangerous.        General instructions   A bottle of beer, a glass of wine, and a glass of hard liquor with a "do not drink" sign over them.   • Do not drink alcohol until your health care provider says that you can.      •Keep track of the frequency and the severity of your symptoms. Give this information to your health care provider.      •Keep all follow-up visits as directed by your health care provider. This is important. This includes visits with specialists.        Contact a health care provider if:    •Your symptoms do not improve.      •You have another injury.        Get help right away if you:    •Have a severe or worsening headache.      •Are confused.      •Have trouble staying awake.      •Faint.      •Vomit.      •Have weakness or numbness in any part of your body.      •Have a seizure.      •Have trouble speaking.        Summary    •Post-concussion syndrome is when symptoms that occur after a concussion last longer than normal.      •Symptoms usually go away on their own over time. Depending on your symptoms, you may need treatment, such as medicines or rehabilitation therapy.      •Rest your brain and body for a few days after your injury. Gradually return to normal activities as told by your health care provider.      •Get plenty of sleep, and avoid alcohol and opioid pain medicines while recovering from a concussion

## 2024-08-30 NOTE — ED PROVIDER NOTE - PATIENT PORTAL LINK FT
You can access the FollowMyHealth Patient Portal offered by [facility] by registering at the following website: [email or web address]. By joining [facility]’s FollowMyHealth portal, you will also be able to view your health information using other applications (apps) compatible with our system.
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
## 2024-08-30 NOTE — ED PROVIDER NOTE - PROVIDER TOKENS
PROVIDER:[TOKEN:[[id number]]],FREE:[LAST:[suture removal in 7 days],PHONE:[(   )    -],FAX:[(   )    -]]
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
## 2024-08-30 NOTE — ED PROVIDER NOTE - CARE PLAN
Principal Discharge DX:	Nasal laceration  Secondary Diagnosis:	Facial fracture  Secondary Diagnosis:	Head injury   1
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
## 2024-08-30 NOTE — ED PROVIDER NOTE - PHYSICAL EXAMINATION
Gen - WDWN, NAD, comfortable and non-toxic appearing  Skin - warm, dry, intact   HEENT - AT/NC, no nasal discharge, airway patent, neck supple and FROM  CV - S1S2, R/R/R  Resp - CTAB, no r/r/w  GI - soft, ND, NT, no CVAT b/l   MS - No acute or gross deformities noted to extremities. No midline spinal tenderness or step off on palpation  Neuro - AxOx3, ambulatory without gait disturbance Vital Signs - nursing notes reviewed and confirmed  Gen - WDWN, NAD, comfortable and non-toxic appearing, speaking in full sentences   Skin - warm, dry, 4cm curvilinear laceration to the nosebridge with deep subcutaneous wound and muscular exposure, no bony exposure, no FB noted   HEENT - AT/NC, PERRL, pupils 3mm b/l, no pain with EOM, sclera clear, moist oral mucosa, +mild mid nasal and frontal sinus TTP with no crepitus or deformity, TM intact b/l with good cone of lights, o/p clear with no erythema, edema, or exudate, uvula midline, airway patent, neck supple and NT, FROM, no palpable nodes  CV - S1S2, R/R/R  Resp - respiration non-labored, CTAB, symmetric bs b/l, no r/r/w  GI - NABS, soft, ND, NT, no rebound or guarding, no CVAT b/l   MS - w/w/p, no c/c/e, calves supple and NT, distal pulses symmetric b/l, brisk cap refills, +SILT, NV intact, FROM, compartment soft  Neuro - AxOx3, no focal neuro deficits, CN II-XII grossly intact, fluent speech, cerebellar function intact, negative pronator drift, negative nystagmus, ambulatory without gait disturbance  Psych - Cooperative, appropriate mood, language/behaviors

## 2024-08-31 PROBLEM — Z78.9 OTHER SPECIFIED HEALTH STATUS: Chronic | Status: ACTIVE | Noted: 2023-05-25

## 2024-09-06 ENCOUNTER — EMERGENCY (EMERGENCY)
Facility: HOSPITAL | Age: 24
LOS: 1 days | Discharge: HOME | End: 2024-09-06
Attending: EMERGENCY MEDICINE | Admitting: EMERGENCY MEDICINE
Payer: COMMERCIAL

## 2024-09-06 VITALS
HEIGHT: 62 IN | WEIGHT: 130.07 LBS | SYSTOLIC BLOOD PRESSURE: 121 MMHG | DIASTOLIC BLOOD PRESSURE: 75 MMHG | HEART RATE: 96 BPM | RESPIRATION RATE: 18 BRPM | OXYGEN SATURATION: 99 % | TEMPERATURE: 98 F

## 2024-09-06 DIAGNOSIS — S01.21XD LACERATION WITHOUT FOREIGN BODY OF NOSE, SUBSEQUENT ENCOUNTER: ICD-10-CM

## 2024-09-06 PROCEDURE — L9995: CPT

## 2024-09-06 NOTE — ED ADULT TRIAGE NOTE - BMI (KG/M2)
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 Patient formerly followed with [facility] wound care where she was on doxycycline and had a wound vac until 2 weeks ago. Currently continues on antibiotics prior to admission, however wound is worsening and patient reports subjective fever for the past 3 days. Patient had an MRI in late june which revealed a fluid collection without osteomyelitis ED course: VSS. CBC with WBC count of 15.54. CRP elevated. Metabolic panel normal. UA normal. CT performed with 6.0 by 3.3 cm wound of the left hip. Patient received dose of zosyn. Orthopedics consulted. Patient admitted to medicine for further evaluation and treatment. Pt s/p I and D, placement abx beads and revision L  hip hemiarthroplasty 8/18/22.   Partial hip revision, femur component only, exchange acetabular liner I&D, abx bead placement 8/17/22. 23.8

## 2024-09-06 NOTE — ED PROVIDER NOTE - PATIENT PORTAL LINK FT
You can access the FollowMyHealth Patient Portal offered by [facility] by registering at the following website: [email or web address]. By joining [facility]’s FollowMyHealth portal, you will also be able to view your health information using other applications (apps) compatible with our system.

## 2024-09-06 NOTE — ED PROVIDER NOTE - ATTENDING CONTRIBUTION TO CARE
Patient returns for suture removal. No fever, swelling, drainage. Also sustained laceration to left index finger this morning while using a kitchen knife. Right hand dominant.    General: NAD. HEENT: normocephalic. sutured laceration to nose C/D/I, mmm   Chest: RRR, nl S1 and S2, no m/r/g. Resp: CTAB, no w/r/r  Abd: nl BS, soft, nt/nd. Ext: Warm, dry  Skin: sutures to nose as above. superficial laceration to distal left index finger. neurovascular and tendon intact distal to injury     MDM: Sutures removed, good wound healing in progress. Left index finger laceration too superficial to suture. wound care provided. Return to the ED immediately if getting worse, not improving, or if having any new or troubling symptoms.
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
## 2024-09-06 NOTE — ED ADULT NURSE NOTE - OBJECTIVE STATEMENT
Patient presents for suture removal of the nose. Denies worsening pain, redness, swelling, purulent drainage, fever, chills.

## 2024-09-06 NOTE — ED ADULT NURSE NOTE - CAS TRG GENERAL AIRWAY, MLM
[facility]    Please call back        Patent

## 2024-09-06 NOTE — ED ADULT NURSE NOTE - NSFALLUNIVINTERV_ED_ALL_ED
Bed/Stretcher in lowest position, wheels locked, appropriate side rails in place/Call bell, personal items and telephone in reach/Instruct patient to call for assistance before getting out of bed/chair/stretcher/Non-slip footwear applied when patient is off stretcher/[place] to call system/Physically safe environment - no spills, clutter or unnecessary equipment/Purposeful proactive rounding/Room/bathroom lighting operational, light cord in reach

## 2024-09-06 NOTE — ED PROVIDER NOTE - NSFOLLOWUPINSTRUCTIONS_ED_ALL_ED_FT
YOU WERE SEEN IN THE ED FOR: suture removal    WHILE YOU WERE HERE, YOU HAD: sutures removed     FOR PAIN, YOU MAY TAKE TYLENOL (Acetaminophen) AND/OR IBUPROFEN (Advil or Motrin). FOLLOW THE INSTRUCTIONS ON THE LABEL/CONTAINER.    SEEK IMMEDIATE MEDICAL CARE IF YOU HAVE ANY OF THE FOLLOWING SYMPTOMS: swelling around the wound, worsening pain, drainage from the wound, red streaking going away from your wound, inability to move finger or toe near the laceration, or discoloration of skin near the laceration.

## 2025-01-03 ENCOUNTER — INPATIENT (INPATIENT)
Facility: HOSPITAL | Age: 25
LOS: 0 days | Discharge: HOME | End: 2025-01-04
Attending: STUDENT IN AN ORGANIZED HEALTH CARE EDUCATION/TRAINING PROGRAM | Admitting: STUDENT IN AN ORGANIZED HEALTH CARE EDUCATION/TRAINING PROGRAM
Payer: COMMERCIAL

## 2025-01-03 VITALS
TEMPERATURE: 99 F | SYSTOLIC BLOOD PRESSURE: 119 MMHG | WEIGHT: 240.08 LBS | HEIGHT: 78 IN | RESPIRATION RATE: 18 BRPM | HEART RATE: 111 BPM | DIASTOLIC BLOOD PRESSURE: 73 MMHG | OXYGEN SATURATION: 96 %

## 2025-01-03 DIAGNOSIS — T14.8XXA OTHER INJURY OF UNSPECIFIED BODY REGION, INITIAL ENCOUNTER: ICD-10-CM

## 2025-01-03 DIAGNOSIS — Z01.818 ENCOUNTER FOR OTHER PREPROCEDURAL EXAMINATION: ICD-10-CM

## 2025-01-03 DIAGNOSIS — Z29.9 ENCOUNTER FOR PROPHYLACTIC MEASURES, UNSPECIFIED: ICD-10-CM

## 2025-01-03 LAB
ANION GAP SERPL CALC-SCNC: 12 MMOL/L — SIGNIFICANT CHANGE UP (ref 5–17)
APTT BLD: 29.7 SEC — SIGNIFICANT CHANGE UP (ref 24.5–35.6)
APTT BLD: 30.4 SEC — SIGNIFICANT CHANGE UP (ref 24.5–35.6)
BASOPHILS # BLD AUTO: 0.05 K/UL — SIGNIFICANT CHANGE UP (ref 0–0.2)
BASOPHILS NFR BLD AUTO: 0.5 % — SIGNIFICANT CHANGE UP (ref 0–2)
BLD GP AB SCN SERPL QL: NEGATIVE — SIGNIFICANT CHANGE UP
BLD GP AB SCN SERPL QL: NEGATIVE — SIGNIFICANT CHANGE UP
BUN SERPL-MCNC: 18 MG/DL — SIGNIFICANT CHANGE UP (ref 7–23)
CALCIUM SERPL-MCNC: 9.3 MG/DL — SIGNIFICANT CHANGE UP (ref 8.4–10.5)
CHLORIDE SERPL-SCNC: 103 MMOL/L — SIGNIFICANT CHANGE UP (ref 96–108)
CO2 SERPL-SCNC: 25 MMOL/L — SIGNIFICANT CHANGE UP (ref 22–31)
CREAT SERPL-MCNC: 1.19 MG/DL — SIGNIFICANT CHANGE UP (ref 0.5–1.3)
EGFR: 87 ML/MIN/1.73M2 — SIGNIFICANT CHANGE UP
EGFR: 87 ML/MIN/1.73M2 — SIGNIFICANT CHANGE UP
EOSINOPHIL # BLD AUTO: 0.08 K/UL — SIGNIFICANT CHANGE UP (ref 0–0.5)
EOSINOPHIL NFR BLD AUTO: 0.8 % — SIGNIFICANT CHANGE UP (ref 0–6)
GLUCOSE SERPL-MCNC: 87 MG/DL — SIGNIFICANT CHANGE UP (ref 70–99)
HCT VFR BLD CALC: 41.5 % — SIGNIFICANT CHANGE UP (ref 39–50)
HGB BLD-MCNC: 14.6 G/DL — SIGNIFICANT CHANGE UP (ref 13–17)
IMM GRANULOCYTES NFR BLD AUTO: 0.4 % — SIGNIFICANT CHANGE UP (ref 0–0.9)
INR BLD: 0.89 — SIGNIFICANT CHANGE UP (ref 0.85–1.16)
INR BLD: 0.89 — SIGNIFICANT CHANGE UP (ref 0.85–1.16)
LYMPHOCYTES # BLD AUTO: 1.1 K/UL — SIGNIFICANT CHANGE UP (ref 1–3.3)
LYMPHOCYTES # BLD AUTO: 11.6 % — LOW (ref 13–44)
MCHC RBC-ENTMCNC: 33 PG — SIGNIFICANT CHANGE UP (ref 27–34)
MCHC RBC-ENTMCNC: 35.2 G/DL — SIGNIFICANT CHANGE UP (ref 32–36)
MCV RBC AUTO: 93.7 FL — SIGNIFICANT CHANGE UP (ref 80–100)
MONOCYTES # BLD AUTO: 0.73 K/UL — SIGNIFICANT CHANGE UP (ref 0–0.9)
MONOCYTES NFR BLD AUTO: 7.7 % — SIGNIFICANT CHANGE UP (ref 2–14)
NEUTROPHILS # BLD AUTO: 7.46 K/UL — HIGH (ref 1.8–7.4)
NEUTROPHILS NFR BLD AUTO: 79 % — HIGH (ref 43–77)
NRBC # BLD: 0 /100 WBCS — SIGNIFICANT CHANGE UP (ref 0–0)
NRBC BLD-RTO: 0 /100 WBCS — SIGNIFICANT CHANGE UP (ref 0–0)
PLATELET # BLD AUTO: 315 K/UL — SIGNIFICANT CHANGE UP (ref 150–400)
POTASSIUM SERPL-MCNC: 4.3 MMOL/L — SIGNIFICANT CHANGE UP (ref 3.5–5.3)
POTASSIUM SERPL-SCNC: 4.3 MMOL/L — SIGNIFICANT CHANGE UP (ref 3.5–5.3)
PROTHROM AB SERPL-ACNC: 10.4 SEC — SIGNIFICANT CHANGE UP (ref 9.9–13.4)
PROTHROM AB SERPL-ACNC: 10.4 SEC — SIGNIFICANT CHANGE UP (ref 9.9–13.4)
RBC # BLD: 4.43 M/UL — SIGNIFICANT CHANGE UP (ref 4.2–5.8)
RBC # FLD: 12.3 % — SIGNIFICANT CHANGE UP (ref 10.3–14.5)
RH IG SCN BLD-IMP: POSITIVE — SIGNIFICANT CHANGE UP
RH IG SCN BLD-IMP: POSITIVE — SIGNIFICANT CHANGE UP
SODIUM SERPL-SCNC: 140 MMOL/L — SIGNIFICANT CHANGE UP (ref 135–145)
WBC # BLD: 9.46 K/UL — SIGNIFICANT CHANGE UP (ref 3.8–10.5)
WBC # FLD AUTO: 9.46 K/UL — SIGNIFICANT CHANGE UP (ref 3.8–10.5)

## 2025-01-03 PROCEDURE — 99285 EMERGENCY DEPT VISIT HI MDM: CPT

## 2025-01-03 PROCEDURE — 93010 ELECTROCARDIOGRAM REPORT: CPT

## 2025-01-03 PROCEDURE — 73130 X-RAY EXAM OF HAND: CPT | Mod: 26,LT

## 2025-01-03 PROCEDURE — 99223 1ST HOSP IP/OBS HIGH 75: CPT | Mod: GC

## 2025-01-03 PROCEDURE — 71045 X-RAY EXAM CHEST 1 VIEW: CPT | Mod: 26

## 2025-01-03 RX ORDER — CEFAZOLIN SODIUM IN 0.9 % NACL 3 G/100 ML
INTRAVENOUS SOLUTION, PIGGYBACK (ML) INTRAVENOUS
Refills: 0 | Status: DISCONTINUED | OUTPATIENT
Start: 2025-01-03 | End: 2025-01-03

## 2025-01-03 RX ORDER — MELATONIN 5 MG
3 TABLET ORAL AT BEDTIME
Refills: 0 | Status: DISCONTINUED | OUTPATIENT
Start: 2025-01-03 | End: 2025-01-04

## 2025-01-03 RX ORDER — ACETAMINOPHEN 500 MG/5ML
650 LIQUID (ML) ORAL EVERY 6 HOURS
Refills: 0 | Status: DISCONTINUED | OUTPATIENT
Start: 2025-01-03 | End: 2025-01-04

## 2025-01-03 RX ORDER — CEFAZOLIN SODIUM IN 0.9 % NACL 3 G/100 ML
500 INTRAVENOUS SOLUTION, PIGGYBACK (ML) INTRAVENOUS ONCE
Refills: 0 | Status: COMPLETED | OUTPATIENT
Start: 2025-01-03 | End: 2025-01-03

## 2025-01-03 RX ORDER — IBUPROFEN 200 MG
600 TABLET ORAL ONCE
Refills: 0 | Status: COMPLETED | OUTPATIENT
Start: 2025-01-03 | End: 2025-01-03

## 2025-01-03 RX ORDER — MAGNESIUM, ALUMINUM HYDROXIDE 200-200 MG
30 TABLET,CHEWABLE ORAL EVERY 4 HOURS
Refills: 0 | Status: DISCONTINUED | OUTPATIENT
Start: 2025-01-03 | End: 2025-01-04

## 2025-01-03 RX ORDER — CEFAZOLIN SODIUM IN 0.9 % NACL 3 G/100 ML
500 INTRAVENOUS SOLUTION, PIGGYBACK (ML) INTRAVENOUS EVERY 8 HOURS
Refills: 0 | Status: DISCONTINUED | OUTPATIENT
Start: 2025-01-04 | End: 2025-01-04

## 2025-01-03 RX ORDER — LIDOCAINE HCL/PF 10 MG/ML
20 VIAL (ML) INJECTION ONCE
Refills: 0 | Status: COMPLETED | OUTPATIENT
Start: 2025-01-03 | End: 2025-01-03

## 2025-01-03 RX ADMIN — Medication 600 MILLIGRAM(S): at 19:23

## 2025-01-03 RX ADMIN — Medication 100 MILLIGRAM(S): at 20:18

## 2025-01-03 RX ADMIN — Medication 600 MILLIGRAM(S): at 22:04

## 2025-01-04 VITALS
TEMPERATURE: 98 F | HEART RATE: 79 BPM | OXYGEN SATURATION: 93 % | DIASTOLIC BLOOD PRESSURE: 78 MMHG | SYSTOLIC BLOOD PRESSURE: 126 MMHG | RESPIRATION RATE: 16 BRPM

## 2025-01-04 LAB
ANION GAP SERPL CALC-SCNC: 9 MMOL/L — SIGNIFICANT CHANGE UP (ref 5–17)
BASOPHILS # BLD AUTO: 0.04 K/UL — SIGNIFICANT CHANGE UP (ref 0–0.2)
BASOPHILS NFR BLD AUTO: 0.5 % — SIGNIFICANT CHANGE UP (ref 0–2)
BUN SERPL-MCNC: 17 MG/DL — SIGNIFICANT CHANGE UP (ref 7–23)
CALCIUM SERPL-MCNC: 8.8 MG/DL — SIGNIFICANT CHANGE UP (ref 8.4–10.5)
CHLORIDE SERPL-SCNC: 103 MMOL/L — SIGNIFICANT CHANGE UP (ref 96–108)
CO2 SERPL-SCNC: 27 MMOL/L — SIGNIFICANT CHANGE UP (ref 22–31)
CREAT SERPL-MCNC: 1.09 MG/DL — SIGNIFICANT CHANGE UP (ref 0.5–1.3)
EGFR: 97 ML/MIN/1.73M2 — SIGNIFICANT CHANGE UP
EGFR: 97 ML/MIN/1.73M2 — SIGNIFICANT CHANGE UP
EOSINOPHIL # BLD AUTO: 0.11 K/UL — SIGNIFICANT CHANGE UP (ref 0–0.5)
EOSINOPHIL NFR BLD AUTO: 1.5 % — SIGNIFICANT CHANGE UP (ref 0–6)
GLUCOSE SERPL-MCNC: 75 MG/DL — SIGNIFICANT CHANGE UP (ref 70–99)
HCT VFR BLD CALC: 38 % — LOW (ref 39–50)
HGB BLD-MCNC: 12.6 G/DL — LOW (ref 13–17)
IMM GRANULOCYTES NFR BLD AUTO: 0.4 % — SIGNIFICANT CHANGE UP (ref 0–0.9)
LYMPHOCYTES # BLD AUTO: 1.48 K/UL — SIGNIFICANT CHANGE UP (ref 1–3.3)
LYMPHOCYTES # BLD AUTO: 19.6 % — SIGNIFICANT CHANGE UP (ref 13–44)
MAGNESIUM SERPL-MCNC: 2.1 MG/DL — SIGNIFICANT CHANGE UP (ref 1.6–2.6)
MCHC RBC-ENTMCNC: 31.4 PG — SIGNIFICANT CHANGE UP (ref 27–34)
MCHC RBC-ENTMCNC: 33.2 G/DL — SIGNIFICANT CHANGE UP (ref 32–36)
MCV RBC AUTO: 94.8 FL — SIGNIFICANT CHANGE UP (ref 80–100)
MONOCYTES # BLD AUTO: 0.71 K/UL — SIGNIFICANT CHANGE UP (ref 0–0.9)
MONOCYTES NFR BLD AUTO: 9.4 % — SIGNIFICANT CHANGE UP (ref 2–14)
NEUTROPHILS # BLD AUTO: 5.19 K/UL — SIGNIFICANT CHANGE UP (ref 1.8–7.4)
NEUTROPHILS NFR BLD AUTO: 68.6 % — SIGNIFICANT CHANGE UP (ref 43–77)
NRBC # BLD: 0 /100 WBCS — SIGNIFICANT CHANGE UP (ref 0–0)
NRBC BLD-RTO: 0 /100 WBCS — SIGNIFICANT CHANGE UP (ref 0–0)
PHOSPHATE SERPL-MCNC: 4.9 MG/DL — HIGH (ref 2.5–4.5)
PLATELET # BLD AUTO: 269 K/UL — SIGNIFICANT CHANGE UP (ref 150–400)
POTASSIUM SERPL-MCNC: 3.9 MMOL/L — SIGNIFICANT CHANGE UP (ref 3.5–5.3)
POTASSIUM SERPL-SCNC: 3.9 MMOL/L — SIGNIFICANT CHANGE UP (ref 3.5–5.3)
RBC # BLD: 4.01 M/UL — LOW (ref 4.2–5.8)
RBC # FLD: 12.5 % — SIGNIFICANT CHANGE UP (ref 10.3–14.5)
SODIUM SERPL-SCNC: 139 MMOL/L — SIGNIFICANT CHANGE UP (ref 135–145)
WBC # BLD: 7.56 K/UL — SIGNIFICANT CHANGE UP (ref 3.8–10.5)
WBC # FLD AUTO: 7.56 K/UL — SIGNIFICANT CHANGE UP (ref 3.8–10.5)

## 2025-01-04 PROCEDURE — 83735 ASSAY OF MAGNESIUM: CPT

## 2025-01-04 PROCEDURE — 84100 ASSAY OF PHOSPHORUS: CPT

## 2025-01-04 PROCEDURE — 85025 COMPLETE CBC W/AUTO DIFF WBC: CPT

## 2025-01-04 PROCEDURE — 80048 BASIC METABOLIC PNL TOTAL CA: CPT

## 2025-01-04 PROCEDURE — 90471 IMMUNIZATION ADMIN: CPT

## 2025-01-04 PROCEDURE — 86901 BLOOD TYPING SEROLOGIC RH(D): CPT

## 2025-01-04 PROCEDURE — 93005 ELECTROCARDIOGRAM TRACING: CPT

## 2025-01-04 PROCEDURE — 85610 PROTHROMBIN TIME: CPT

## 2025-01-04 PROCEDURE — 99233 SBSQ HOSP IP/OBS HIGH 50: CPT | Mod: GC

## 2025-01-04 PROCEDURE — 85730 THROMBOPLASTIN TIME PARTIAL: CPT

## 2025-01-04 PROCEDURE — 71045 X-RAY EXAM CHEST 1 VIEW: CPT

## 2025-01-04 PROCEDURE — 90715 TDAP VACCINE 7 YRS/> IM: CPT

## 2025-01-04 PROCEDURE — 86900 BLOOD TYPING SEROLOGIC ABO: CPT

## 2025-01-04 PROCEDURE — 99285 EMERGENCY DEPT VISIT HI MDM: CPT | Mod: 25

## 2025-01-04 PROCEDURE — 99239 HOSP IP/OBS DSCHRG MGMT >30: CPT

## 2025-01-04 PROCEDURE — 86850 RBC ANTIBODY SCREEN: CPT

## 2025-01-04 PROCEDURE — 36415 COLL VENOUS BLD VENIPUNCTURE: CPT

## 2025-01-04 PROCEDURE — 73130 X-RAY EXAM OF HAND: CPT

## 2025-01-04 DEVICE — K-WIRE BRASSELER (SMOOTH) DOUBLE TROCAR 1.1MM X 4": Type: IMPLANTABLE DEVICE | Site: LEFT | Status: FUNCTIONAL

## 2025-01-04 RX ORDER — OXYCODONE HYDROCHLORIDE 30 MG/1
5 TABLET ORAL EVERY 6 HOURS
Refills: 0 | Status: DISCONTINUED | OUTPATIENT
Start: 2025-01-04 | End: 2025-01-04

## 2025-01-04 RX ORDER — CEPHALEXIN 250 MG/1
1 CAPSULE ORAL
Qty: 28 | Refills: 0
Start: 2025-01-04 | End: 2025-01-10

## 2025-01-04 RX ADMIN — Medication 650 MILLIGRAM(S): at 01:10

## 2025-01-04 RX ADMIN — OXYCODONE HYDROCHLORIDE 5 MILLIGRAM(S): 30 TABLET ORAL at 05:00

## 2025-01-04 RX ADMIN — Medication 650 MILLIGRAM(S): at 00:10

## 2025-01-04 RX ADMIN — OXYCODONE HYDROCHLORIDE 5 MILLIGRAM(S): 30 TABLET ORAL at 06:01

## 2025-01-04 RX ADMIN — Medication 100 MILLIGRAM(S): at 09:35

## 2025-01-05 ENCOUNTER — EMERGENCY (EMERGENCY)
Facility: HOSPITAL | Age: 25
LOS: 1 days | Discharge: HOME | End: 2025-01-05
Admitting: EMERGENCY MEDICINE
Payer: COMMERCIAL

## 2025-01-05 VITALS
TEMPERATURE: 99 F | RESPIRATION RATE: 18 BRPM | HEART RATE: 69 BPM | SYSTOLIC BLOOD PRESSURE: 145 MMHG | DIASTOLIC BLOOD PRESSURE: 68 MMHG | OXYGEN SATURATION: 96 % | WEIGHT: 240.08 LBS | HEIGHT: 78 IN

## 2025-01-05 DIAGNOSIS — M79.642 PAIN IN LEFT HAND: ICD-10-CM

## 2025-01-05 DIAGNOSIS — Z76.0 ENCOUNTER FOR ISSUE OF REPEAT PRESCRIPTION: ICD-10-CM

## 2025-01-05 PROCEDURE — 99284 EMERGENCY DEPT VISIT MOD MDM: CPT

## 2025-01-05 PROCEDURE — 99283 EMERGENCY DEPT VISIT LOW MDM: CPT

## 2025-01-05 NOTE — ED PROVIDER NOTE - NS ED ATTENDING NAME FT
EXAMINATION:
CT HEAD WITHOUT CONTRAST
 
CLINICAL INFORMATION:
Encephalopathy
 
COMPARISON:
None
 
TECHNIQUE:
Contiguous axial imaging was performed from the skull base to vertex without
intravenous administration of contrast.
 
DLP:
604.30 mGy-cm
 
FINDINGS:
There is no evidence of acute intracranial hemorrhage or territorial
infarction. No abnormal mass effect or midline shift is seen. Gray to white
matter differentiation is well preserved. No extra-axial fluid collections
are identified. There is mild loss of brain volume and prominence of CSF
space, more than expected for patient's age.
 
The ventricles are normal in size. There is no abnormal attenuation within
the brain parenchyma. The osseous structures and soft tissues are normal. The
mastoid air cells and visualized portions of the paranasal sinuses are well
aerated.
 
IMPRESSION:
No acute intracranial pathology. [place]

## 2025-01-05 NOTE — ED PROVIDER NOTE - OBJECTIVE STATEMENT
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  also reports pain in L hand, has not had any meds for pain since dc.  also requesting food as not eaten since dc.  denies f/c, numbness/weakness, paresthesias, or new injuries.

## 2025-01-05 NOTE — ED PROVIDER NOTE - CARE PROVIDERS DIRECT ADDRESSES
[email or web address]

## 2025-01-05 NOTE — ED PROVIDER NOTE - CARE PROVIDER_API CALL
Viraj Harris  Plastic Surgery  [address] [place] [address]  [place] [address]  Phone: [phone]  Fax: [phone]  Follow Up Time:

## 2025-01-05 NOTE — ED PROVIDER NOTE - PHYSICAL EXAMINATION
Gen: well appearing, no acute distress  Skin: warm/dry, no rash noted  Resp: breathing comfortably, speaking in full sentences, no dyspnea  LUE in short arm splint, able to move digits (decreased in thumb)/brisk cap refill, SILT, radial pulse intact  Neuro: alert/oriented, ambulatory

## 2025-01-05 NOTE — ED PROVIDER NOTE - CLINICAL SUMMARY MEDICAL DECISION MAKING FREE TEXT BOX
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  on exam vss, comfortable appearing, LUE in short arm splint, able to move digits (decreased in thumb)/brisk cap refill, SILT, radial pulse intact.  rx sent to new pharmacy, given analgesia in ED and educated on close f/u with hand outpt.  discussed strict return parameters

## 2025-01-05 NOTE — ED PROVIDER NOTE - PATIENT PORTAL LINK FT
You can access the FollowMyHealth Patient Portal offered by [facility] by registering at the following website: [email or web address]. By joining [facility]’s FollowMyHealth portal, you will also be able to view your health information using other applications (apps) compatible with our system.

## 2025-01-05 NOTE — ED ADULT NURSE NOTE - CINV DISCH MEDS REVIEWED YN
Medical Record reviewed.  Dave Husain was discharged from [facility] on 8/3/23.  30 day end date: 9/3/23  Diagnosis/History:   left knee revision arthroplasty     REASON FOR ADMISSION/HISTORY OF PRESENT ILLNESS:     The patient is a 62 year old male who presented with a history of persistent pain and disability secondary to above diagnosis.  Conservative treatment measures have failed to provide significant relief, and the patient now desires to proceed with total joint replacement surgery. Preoperative history and physical examination with clearance was completed by Erum Amos APNP.     HOSPITAL COURSE:     On 8/2/2023 , the patient underwent the above procedure with Dr. Cochran at [facility] [place]. The surgery was performed using General anesthesia. The patient was treated with appropriate doses of antibiotics preoperatively and postoperatively.  Blood loss was moderate during the surgery. The patient tolerated the procedure without complication or difficulty and was transferred to the recovery room and ultimately the general orthopedic floor without incident.     Drain was utilized and was removed on post op day 1.     The patient was started on Xarelto daily postop day 1.      The wound was inspected on a daily basis and remained benign throughout the entire hospital stay.  Incision has been inspected and there is no drainage, without  dehiscence, without  signs of wound infection.       Vitals remained stable during the stay and there was no significant elevation of temp.     During the patients hospital stay  The patient remained stable and there was no need for any type of consultation     Hemoglobin and hematocrit levels were also monitored, and on postop day 1 the hemoglobin and hematocrit were 12.1 and 35.1 respectively.                             Pain was well controlled with oral medication prior to discharge.      The patient did not require any blood transfusions     Physical and  occupational therapy were initiated on postop day 1. The patient had also demonstrated independence in basic activities of daily living, and the plan was to discharge to Home on postop day 1.       PHYSICAL EXAM:   A&O x 3  Skin: Dressing is C/D/I.    Musculoskeletal: Dorsiflexion/Plantarflexion intact.    Neuro: Distal sensation intact.    Vascular: 2+ D.P.  Calves soft, nontender.  Pollo's test negative. Thigh compartments soft.      Patient is being enrolled in the High Risk Transition of Care program due to Risk of Readmission score or diagnosis.  Episode of care created to establish routine post discharge patient outreach calls.  Please call if you have any questions.    Evangelista Daly, RN, MSN  Transitional Care RN  [phone]     Yes

## 2025-01-05 NOTE — ED PROVIDER NOTE - NSFOLLOWUPINSTRUCTIONS_ED_ALL_ED_FT
You were treated for a laceration of the hand that required surgery due to extensive involvement of the ligaments and tendons of the hand. After the procedure your hand was placed in a cast.   In order to keep the cast clean make sure to:   - Keep the cast clean and dry.  - Avoid placing powder, lotion or deodorant on or near the cast.  - Don't pull the padding out of the cast.  - Cover the cast with a plastic bag or wrap the cast to bathe.  - Check the cast every day and contact your healthcare provider if you notice any cracks, dents, holes, or flaking on your cast.  - Keep weight off your cast.  - Do not use sharp objects.  - Try blowing cool air from a hair dryer or fan into the cast to help relieve itching.  Make sure to take your antibiotic medication Cephalexin as prescribed (4 times a day for 7 days)   Please followup with Dr. Viraj Harris , the surgeon who performed the repair in the next week. his number has been provided ([phone])  Please follow up with your Primary Care Physician within one week of discharge from the hospital.  Return to the hospital for any worsening fevers, chills, chest pain, purulence or drainage from the wound.Provenance Info: On: January 4, 2025, 11:03:50AM -0500; Authored by: GOPAL Sanderson; Organization: [facility]

## 2025-01-05 NOTE — ED ADULT TRIAGE NOTE - HEIGHT IN CM
Quality 137: Melanoma: Continuity Of Care - Recall System: Patient information entered into a recall system that includes: target date for the next exam specified AND a process to follow up with patients regarding missed or unscheduled appointments Detail Level: Detailed When Should The Patient Follow-Up For Their Next Full-Body Skin Exam?: 6 Months 198.12

## 2025-01-05 NOTE — ED ADULT NURSE NOTE - OBJECTIVE STATEMENT
Received ambulatory with chief complaints of needing to  antibiotic prescription and requesting his discharge papers from hospital visit. Patient AOX4, speaking full sentences. Patient requested medication to be sent to closer pharmacy. [facility] aware. Medication sent to pharmacy requested. Patient also endorsing hand pain, requested pain medications. Pain medications per MAR and administered. Patient subsequently cleared for d/c by CINT[facility].
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
## 2025-01-11 DIAGNOSIS — S66.222A LACERATION OF EXTENSOR MUSCLE, FASCIA AND TENDON OF LEFT THUMB AT WRIST AND HAND LEVEL, INITIAL ENCOUNTER: ICD-10-CM

## 2025-01-11 DIAGNOSIS — S62.292B: ICD-10-CM
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
## 2025-01-18 ENCOUNTER — EMERGENCY (EMERGENCY)
Facility: HOSPITAL | Age: 25
LOS: 1 days | Discharge: HOME | End: 2025-01-18
Admitting: EMERGENCY MEDICINE
Payer: COMMERCIAL

## 2025-01-18 VITALS
OXYGEN SATURATION: 98 % | RESPIRATION RATE: 18 BRPM | SYSTOLIC BLOOD PRESSURE: 112 MMHG | DIASTOLIC BLOOD PRESSURE: 76 MMHG | HEART RATE: 76 BPM

## 2025-01-18 VITALS
TEMPERATURE: 100 F | WEIGHT: 250 LBS | OXYGEN SATURATION: 97 % | HEART RATE: 84 BPM | RESPIRATION RATE: 18 BRPM | DIASTOLIC BLOOD PRESSURE: 89 MMHG | HEIGHT: 78 IN | SYSTOLIC BLOOD PRESSURE: 198 MMHG

## 2025-01-18 DIAGNOSIS — Z46.89 ENCOUNTER FOR FITTING AND ADJUSTMENT OF OTHER SPECIFIED DEVICES: ICD-10-CM

## 2025-01-18 DIAGNOSIS — R03.0 ELEVATED BLOOD-PRESSURE READING, WITHOUT DIAGNOSIS OF HYPERTENSION: ICD-10-CM

## 2025-01-18 PROCEDURE — 99283 EMERGENCY DEPT VISIT LOW MDM: CPT

## 2025-01-18 RX ORDER — IBUPROFEN 200 MG
600 TABLET ORAL ONCE
Refills: 0 | Status: COMPLETED | OUTPATIENT
Start: 2025-01-18 | End: 2025-01-18

## 2025-01-18 RX ADMIN — Medication 600 MILLIGRAM(S): at 20:42

## 2025-01-18 NOTE — ED PROVIDER NOTE - PATIENT PORTAL LINK FT
You can access the FollowMyHealth Patient Portal offered by [facility] by registering at the following website: [email or web address]. By joining [facility]’s FollowMyHealth portal, you will also be able to view your health information using other applications (apps) compatible with our system.

## 2025-01-18 NOTE — ED PROVIDER NOTE - CARE PROVIDER_API CALL
Viraj Harris  Plastic Surgery  [address]  [place] [address]  Phone: [phone]  Fax: [phone]  Follow Up Time:

## 2025-01-18 NOTE — ED PROVIDER NOTE - PHYSICAL EXAMINATION
Gen: disheveled appearing but in no acute distress  Skin: warm/dry, no rash noted  Resp: breathing comfortably, speaking in full sentences, no dyspnea  LUE splint in place w/ thumb immobilized, FROM 2-5th digits, no edema or warmth, SILT, brisk cap refill  Neuro: alert/oriented, ambulatory

## 2025-01-18 NOTE — ED PROVIDER NOTE - CARE PROVIDERS DIRECT ADDRESSES
[email or web address]

## 2025-01-18 NOTE — ED PROVIDER NOTE - NSFOLLOWUPINSTRUCTIONS_ED_ALL_ED_FT
You need to call your hand surgeon to arrange follow up for splint and suture removal     Please call to arrange follow up with primary care doctor within one week  Your blood pressure is elevated so you need to have that rechecked with your primary care doctor

## 2025-01-18 NOTE — ED PROVIDER NOTE - CLINICAL SUMMARY MEDICAL DECISION MAKING FREE TEXT BOX
24 M recent admission/L hand surgery for tendon injury w/ Dr. Harris presents requesting splint removal.  never f/u with hand surgeon.  has no acute complaints.  on exam bp elevated (asxs), LUE splint in place w/ thumb immobilized, FROM 2-5th digits, no edema or warmth, SILT, brisk cap refill.  pt instructed to call to arrange follow up with hand surgeon for splint and suture removal and pcp for blood pressure monitoring.  discussed strict return parameters

## 2025-01-18 NOTE — ED ADULT NURSE NOTE - OBJECTIVE STATEMENT
Pt A&Ox4 presents to ED requesting removal of sutures and splint to left hand. Pt had surgery to left hand and has splint in place. Pt reports he has not followed up with surgeon outpatient. Denies fevers/chills, numbness/tingling.

## 2025-01-18 NOTE — ED PROVIDER NOTE - OBJECTIVE STATEMENT
24 M recent admission/L hand surgery for tendon injury w/ Dr. Harris presents requesting splint removal.  pt states he wants to get back to work and wants someone to remove his splint.  pt never f/u with hand surgeon, states he lost his papers.  denies f/c, HA, dizziness, chest pain, numbness/weakness, limited ROM digits, swelling of L arm, or acute injuries.

## (undated) DEVICE — SUT MONOCRYL 3-0 18" PS-1

## (undated) DEVICE — TOURNIQUET CUFF 42" DUAL PORT W PLC

## (undated) DEVICE — Device

## (undated) DEVICE — PACK ORTHO ELBOW HAND

## (undated) DEVICE — DRSG WEBRIL 4"

## (undated) DEVICE — SUT NYLON 3-0 18" PS-2

## (undated) DEVICE — BIPOLAR FORCEP KIRWAN JEWELERS STR 4" X 0.4MM W 12FT CORD (GREEN)

## (undated) DEVICE — DRAPE C ARM MINI

## (undated) DEVICE — BEAVER BLADE MIN-BLADE ROUNDED TIP 1-SIDE SHARP (GREEN)

## (undated) DEVICE — DRSG CAST PLASTER XFAST 4"

## (undated) DEVICE — MARKING PEN W RULER

## (undated) DEVICE — DRAPE 3/4 SHEET 52X76"

## (undated) DEVICE — SUT MONOCRYL 5-0 18" P-3 UNDYED

## (undated) DEVICE — DRSG ACE BANDAGE 4"

## (undated) DEVICE — TOURNIQUET CUFF 18" DUAL PORT SINGLE BLADDER W PLC  (BLACK)

## (undated) DEVICE — TUBING SUCTION NONCONDUCTIVE 6MM X 12FT

## (undated) DEVICE — DRAPE LIGHT HANDLE COVER (GREEN)

## (undated) DEVICE — SUT VICRYL 2-0 27" CT-1 UNDYED

## (undated) DEVICE — DRSG KERLIX ROLL LG 4.5"

## (undated) DEVICE — WARMING BLANKET LOWER ADULT

## (undated) DEVICE — SAW BLADE STRYKER PRECISION 9X0.51X31MM

## (undated) DEVICE — TOURNIQUET CUFF 34" DUAL PORT W PLC

## (undated) DEVICE — BUR MICROAIRE CARBIDE ROUND 4MM

## (undated) DEVICE — VENODYNE/SCD SLEEVE CALF MEDIUM

## (undated) DEVICE — SUT VICRYL 3-0 18" PS-1 UNDYED

## (undated) DEVICE — DRSG COMBINE 5 X 9"

## (undated) DEVICE — DRSG XEROFORM 1 X 8"